# Patient Record
Sex: MALE | Race: ASIAN
[De-identification: names, ages, dates, MRNs, and addresses within clinical notes are randomized per-mention and may not be internally consistent; named-entity substitution may affect disease eponyms.]

---

## 2022-08-06 ENCOUNTER — HOSPITAL ENCOUNTER (INPATIENT)
Dept: HOSPITAL 54 - ER | Age: 87
LOS: 3 days | Discharge: HOME | DRG: 309 | End: 2022-08-09
Attending: INTERNAL MEDICINE | Admitting: NURSE PRACTITIONER
Payer: COMMERCIAL

## 2022-08-06 VITALS — SYSTOLIC BLOOD PRESSURE: 98 MMHG | DIASTOLIC BLOOD PRESSURE: 48 MMHG

## 2022-08-06 VITALS — WEIGHT: 142 LBS | HEIGHT: 67 IN | BODY MASS INDEX: 22.29 KG/M2

## 2022-08-06 DIAGNOSIS — E11.9: ICD-10-CM

## 2022-08-06 DIAGNOSIS — Z87.891: ICD-10-CM

## 2022-08-06 DIAGNOSIS — E87.5: ICD-10-CM

## 2022-08-06 DIAGNOSIS — R13.10: ICD-10-CM

## 2022-08-06 DIAGNOSIS — I10: ICD-10-CM

## 2022-08-06 DIAGNOSIS — D64.9: ICD-10-CM

## 2022-08-06 DIAGNOSIS — I47.1: Primary | ICD-10-CM

## 2022-08-06 DIAGNOSIS — E88.09: ICD-10-CM

## 2022-08-06 DIAGNOSIS — E22.2: ICD-10-CM

## 2022-08-06 DIAGNOSIS — R62.7: ICD-10-CM

## 2022-08-06 DIAGNOSIS — E87.2: ICD-10-CM

## 2022-08-06 DIAGNOSIS — D72.829: ICD-10-CM

## 2022-08-06 DIAGNOSIS — C34.90: ICD-10-CM

## 2022-08-06 DIAGNOSIS — E86.0: ICD-10-CM

## 2022-08-06 DIAGNOSIS — E78.5: ICD-10-CM

## 2022-08-06 LAB
BASOPHILS # BLD AUTO: 0.1 K/UL (ref 0–0.2)
BASOPHILS NFR BLD AUTO: 0.2 % (ref 0–2)
BILIRUB DIRECT SERPL-MCNC: 0.2 MG/DL (ref 0–0.2)
BILIRUB SERPL-MCNC: 0.5 MG/DL (ref 0.2–1)
BILIRUB UR QL STRIP: NEGATIVE
BUN SERPL-MCNC: 14 MG/DL (ref 7–18)
CALCIUM SERPL-MCNC: 8.9 MG/DL (ref 8.5–10.1)
CHLORIDE SERPL-SCNC: 101 MMOL/L (ref 98–107)
CO2 SERPL-SCNC: 27 MMOL/L (ref 21–32)
COLOR UR: YELLOW
CREAT SERPL-MCNC: 0.8 MG/DL (ref 0.6–1.3)
DEPRECATED SQUAMOUS URNS QL MICRO: (no result) /HPF
EOSINOPHIL NFR BLD AUTO: 1.2 % (ref 0–6)
GLUCOSE SERPL-MCNC: 131 MG/DL (ref 74–106)
GLUCOSE UR STRIP-MCNC: NEGATIVE MG/DL
HCT VFR BLD AUTO: 41 % (ref 39–51)
HGB BLD-MCNC: 13.4 G/DL (ref 13.5–17.5)
LEUKOCYTE ESTERASE UR QL STRIP: NEGATIVE
LYMPHOCYTES NFR BLD AUTO: 1.3 K/UL (ref 0.8–4.8)
LYMPHOCYTES NFR BLD AUTO: 5.9 % (ref 20–44)
MAGNESIUM SERPL-MCNC: 2.5 MG/DL (ref 1.8–2.4)
MCHC RBC AUTO-ENTMCNC: 32 G/DL (ref 31–36)
MCV RBC AUTO: 92 FL (ref 80–96)
MONOCYTES NFR BLD AUTO: 1.5 K/UL (ref 0.1–1.3)
MONOCYTES NFR BLD AUTO: 6.7 % (ref 2–12)
NEUTROPHILS # BLD AUTO: 19 K/UL (ref 1.8–8.9)
NEUTROPHILS NFR BLD AUTO: 86 % (ref 43–81)
NITRITE UR QL STRIP: NEGATIVE
PH UR STRIP: 7 [PH] (ref 5–8)
PHOSPHATE SERPL-MCNC: 4.6 MG/DL (ref 2.5–4.9)
PLATELET # BLD AUTO: 490 K/UL (ref 150–450)
POTASSIUM SERPL-SCNC: 5.4 MMOL/L (ref 3.5–5.1)
PROT UR QL STRIP: NEGATIVE MG/DL
RBC # BLD AUTO: 4.49 MIL/UL (ref 4.5–6)
RBC #/AREA URNS HPF: (no result) /HPF (ref 0–2)
SODIUM SERPL-SCNC: 134 MMOL/L (ref 136–145)
TSH SERPL DL<=0.005 MIU/L-ACNC: 1.6 UIU/ML (ref 0.36–3.74)
UROBILINOGEN UR STRIP-MCNC: 1 EU/DL
WBC #/AREA URNS HPF: (no result) /HPF (ref 0–3)
WBC NRBC COR # BLD AUTO: 22.1 K/UL (ref 4.3–11)

## 2022-08-06 PROCEDURE — C9803 HOPD COVID-19 SPEC COLLECT: HCPCS

## 2022-08-06 PROCEDURE — G0378 HOSPITAL OBSERVATION PER HR: HCPCS

## 2022-08-06 PROCEDURE — A6403 STERILE GAUZE>16 <= 48 SQ IN: HCPCS

## 2022-08-06 RX ADMIN — SIMVASTATIN SCH MG: 20 TABLET, FILM COATED ORAL at 22:00

## 2022-08-06 NOTE — NUR
TELE RN NOTES





PT ARRIVED TO UNIT VIA GURNEY PT WAS ABLE TO TRANSFER TO BED ON HIS OWN WITH SOME STAND BY 
ASSIST . PT IS Malay SPEAKING BUT IS ABLE TO COMMUNICATE BASIC NEEDS.PT DAUGHTER AND WIFE 
AT BEDSIDE ABLE TO PROVIDE TRANSLATION. PT A/O X4 ON ROOM AIR TOLERATING WELL. PT PLACED ON 
A TELE MONITOR AT THIS TIME. PT NOTED WITH IV ACCESS ON RFA#20G S/L AND LAC #20G  RUNNING D5 
NS@50ML/HR TOLERATING WELL. PT DID REFUSE ATORVASTATIN PER DAUGHTER PT DOES NOT HAVE HIGH 
CHOLESTEROL EXPLAINED THAT IT IS PROTOCOL FOR PT WITH CHEST PAIN PT STILL REFUSED X3. PT 
DAUGHTER ALSO REFUSED TO GIVE A LIST OF MEDICATIONS TAKEN AT HOME PER DAUGHTER" ITS NOT LIKE 
YOU WILL GIVE HIM AT NIGHT ANYWAY, HES GOING HOME IN THE MORNING AS SOON AS POSSIBLE ITS NOT 
NECESSARY.' DID ASK THE DAUGHTER MARCIA TO PLEASE PROVIDE A LIST IN THE MORNING IF IT IS 
POSSIBLE WILL LET DAY SHIFT NURSE KNOW TO FOLLOW UP. PT WAS NOTED WITH SOME SACRAL REDNESS 
PICTURE TAKEN AND PLACED IN CHART.PT ORIENTED TO UNIT AND ROOM. TABLE WITHIN REACH. CALL 
LIGHT WITHIN REACH. BILATERAL SIDE RAILS UP FOR SAFETY. ALL NEEDS MET AT THIS TIME. WILL 
CONTINUE TO MONITOR.

-------------------------------------------------------------------------------

Addendum: 08/06/22 at 2345 by TARIQ HARRIS RN

-------------------------------------------------------------------------------

per daughter her father is refusing any further lab draws explained to the family why ist 
important we redraw the troponin. daughter insisted enough blood has been taken he doesn't 
need anymore.

## 2022-08-06 NOTE — NUR
RECEIVED PT 87 YRS MALE CAME BY WILBERTO  from home   C/O GENRALIZED WEEKNESS 
ANd chest pain with palpation  hr 165/min ekg  at bed velvet

## 2022-08-06 NOTE — NUR
PETE AT BED SIDE (MARCIA PETERSEN ) [phone]  AT BED SIDE DR. HOLLY VILCHIS WITH  [facility] CONDITION UP DATE

## 2022-08-06 NOTE — NUR
TELE RN NOTES





PT REFUSED LAB DRAW RISK AND BENEFITS EXPLAINED X3 REFUSED X3. WILL CONTINUE TO MONITOR.

## 2022-08-07 VITALS — SYSTOLIC BLOOD PRESSURE: 119 MMHG | DIASTOLIC BLOOD PRESSURE: 69 MMHG

## 2022-08-07 VITALS — DIASTOLIC BLOOD PRESSURE: 54 MMHG | SYSTOLIC BLOOD PRESSURE: 105 MMHG

## 2022-08-07 VITALS — DIASTOLIC BLOOD PRESSURE: 59 MMHG | SYSTOLIC BLOOD PRESSURE: 109 MMHG

## 2022-08-07 VITALS — DIASTOLIC BLOOD PRESSURE: 47 MMHG | SYSTOLIC BLOOD PRESSURE: 100 MMHG

## 2022-08-07 VITALS — SYSTOLIC BLOOD PRESSURE: 117 MMHG | DIASTOLIC BLOOD PRESSURE: 69 MMHG

## 2022-08-07 VITALS — DIASTOLIC BLOOD PRESSURE: 69 MMHG | SYSTOLIC BLOOD PRESSURE: 113 MMHG

## 2022-08-07 LAB
ALBUMIN SERPL BCP-MCNC: 2 G/DL (ref 3.4–5)
ALP SERPL-CCNC: 77 U/L (ref 46–116)
ALT SERPL W P-5'-P-CCNC: 10 U/L (ref 12–78)
AST SERPL W P-5'-P-CCNC: 12 U/L (ref 15–37)
BASOPHILS # BLD AUTO: 0.1 K/UL (ref 0–0.2)
BASOPHILS NFR BLD AUTO: 0.3 % (ref 0–2)
BILIRUB SERPL-MCNC: 0.7 MG/DL (ref 0.2–1)
BUN SERPL-MCNC: 9 MG/DL (ref 7–18)
CALCIUM SERPL-MCNC: 8.2 MG/DL (ref 8.5–10.1)
CHLORIDE SERPL-SCNC: 102 MMOL/L (ref 98–107)
CO2 SERPL-SCNC: 25 MMOL/L (ref 21–32)
CREAT SERPL-MCNC: 0.6 MG/DL (ref 0.6–1.3)
EOSINOPHIL NFR BLD AUTO: 1.5 % (ref 0–6)
FERRITIN SERPL-MCNC: 611 NG/ML (ref 8–388)
GLUCOSE SERPL-MCNC: 100 MG/DL (ref 74–106)
HCT VFR BLD AUTO: 34 % (ref 39–51)
HGB BLD-MCNC: 11.4 G/DL (ref 13.5–17.5)
IRON SERPL-MCNC: 26 UG/DL (ref 50–175)
LYMPHOCYTES NFR BLD AUTO: 1 K/UL (ref 0.8–4.8)
LYMPHOCYTES NFR BLD AUTO: 4.9 % (ref 20–44)
MAGNESIUM SERPL-MCNC: 2.1 MG/DL (ref 1.8–2.4)
MCHC RBC AUTO-ENTMCNC: 33 G/DL (ref 31–36)
MCV RBC AUTO: 90 FL (ref 80–96)
MONOCYTES NFR BLD AUTO: 1.3 K/UL (ref 0.1–1.3)
MONOCYTES NFR BLD AUTO: 6.3 % (ref 2–12)
NEUTROPHILS # BLD AUTO: 18.6 K/UL (ref 1.8–8.9)
NEUTROPHILS NFR BLD AUTO: 87 % (ref 43–81)
PHOSPHATE SERPL-MCNC: 3.1 MG/DL (ref 2.5–4.9)
PLATELET # BLD AUTO: 442 K/UL (ref 150–450)
POTASSIUM SERPL-SCNC: 4.1 MMOL/L (ref 3.5–5.1)
PROT SERPL-MCNC: 6 G/DL (ref 6.4–8.2)
RBC # BLD AUTO: 3.8 MIL/UL (ref 4.5–6)
SODIUM SERPL-SCNC: 132 MMOL/L (ref 136–145)
TIBC SERPL-MCNC: 113 UG/DL (ref 250–450)
WBC NRBC COR # BLD AUTO: 21.3 K/UL (ref 4.3–11)

## 2022-08-07 RX ADMIN — Medication SCH EACH: at 22:11

## 2022-08-07 RX ADMIN — DEXTROSE MONOHYDRATE SCH MLS/HR: 50 INJECTION, SOLUTION INTRAVENOUS at 20:08

## 2022-08-07 RX ADMIN — SIMVASTATIN SCH MG: 20 TABLET, FILM COATED ORAL at 22:05

## 2022-08-07 RX ADMIN — HUMAN INSULIN PRN UNIT: 100 INJECTION, SOLUTION SUBCUTANEOUS at 12:36

## 2022-08-07 RX ADMIN — INSULIN HUMAN PRN UNITS: 100 INJECTION, SOLUTION PARENTERAL at 22:14

## 2022-08-07 RX ADMIN — Medication SCH EACH: at 17:12

## 2022-08-07 RX ADMIN — ENOXAPARIN SODIUM SCH MG: 40 INJECTION SUBCUTANEOUS at 08:21

## 2022-08-07 RX ADMIN — Medication SCH EACH: at 11:59

## 2022-08-07 RX ADMIN — INSULIN HUMAN PRN UNITS: 100 INJECTION, SOLUTION PARENTERAL at 17:17

## 2022-08-07 RX ADMIN — ASPIRIN 81 MG SCH MG: 81 TABLET ORAL at 08:19

## 2022-08-07 NOTE — NUR
RN CLOSING NOTES:

PATIENT ASLEEP IN BED BUT EASILY AROUSES WHEN CALLED BY NURSE.  PATIENT IS ALERT, ORIENTED X 
4.  ON SR WITH HR OF 86.  NO SOB, BREATHING EQUAL AND UNLABORED.  PATIENT HAS IV SITE ON 
LEFT AC # 20 INFUSING WITH D5 NS @ 50 ML/HR, IV SITE, NOTED WITH NO INFILTRATION.  BED 
LOCKED AND IN LOWEST POSITION, CALL LIGHT WITHIN REACH.  WILL ENDORSE TO NEXT SHIFT NURSE 
FOR CONTINUITY OF CARE

## 2022-08-07 NOTE — NUR
TELE RN NOTES





PT IN BED ASLEEP BUT EASILY AWAKE,NO DISTRESS  NOTED AT THIS TIME ,ON ROOM AIR.NO SOB NOTED 
AT THIS TIME ,ALL  NEEDS MET. BEDSIDE TABLE WITHIN EACH. CALL LIGHT WITHIN REACH. BED ALARM 
ON FOR SAFETY. BILATERAL SIDE RAILS UP FOR SAFETY.. PT HAS D5 NS @50ML/HR RUINING WELL ON 
THE LAC #20G. ON TELE MONITORS SR WITH PVC OCCASIONAL, HR 83 AT THIS TIME, WILL CONT TO 
MONITOR

## 2022-08-07 NOTE — NUR
tele rn note 

 Rolanda barba npo seen patient patient condition updated ,refused to eat lunch at this time 
stated that daughter is coming soon ,will f\u

## 2022-08-07 NOTE — NUR
LVN/MED RECON



UNABLE TO UPDATE HOME MEDICATION INFORMATION AT THIS TIME. PATIENT UNABLE TO PROVIDE ANY 
INFORMATION. AS PER PATIENT REQUESTED CALLED DAUGHTER-YAZ, PER DAUGHTER WILL PROVIDE 
INFORMATION LATER. CN MADE AWARE.

## 2022-08-07 NOTE — NUR
PER ONCOLOGIST MAGO, FAXED AUTHORIZATION TO RELEASE RECORDS FROM [facility], TELEPHONE 
CONSENT DONE PER DAUGHTER

## 2022-08-07 NOTE — NUR
TELE RN NOTES





PT IN BED ASLEEP IN NO DISTRESS CONTINUOUS TO BE ON ROOM AIR. ALL NURSING NEEDS MET. BEDSIDE 
TABLE WITHIN EACH. CALL LIGHT WITHIN REACH. BED ALARM ON FOR SAFETY. BILATERAL SIDE RAILS UP 
FOR SAFETY. PT ON TELE MONITOR READING SR. PT HAS D5 NS @50ML/HR RUINING WELL ON THE LAC 
#20G.  PT DID REFUSE LABS LAST NIGH. WILL ENDORSE TO DAY SHIFT NURSE TO SEE IF PT WILL AGREE 
TO HAVE LABS DRAWN TODAY. ENDORSE DHEERAJ TO DAY SHIFT NURSE.

## 2022-08-07 NOTE — NUR
TELE RN OPENING NOTE

PT RECEIVED IN BED, AWAKE, A&O X4, CALM, COOPERATIVE, MAINLY Romanian-SPEAKING BUT IS ABLE TO 
MAKE NEEDS KNOWN WITH LITTLE ENGLISH. PT ON RA WITH CURRENT O2SAT OF 95%; NO S/S OF RESP 
DISTRESS, NO SOB OR COUGH, NON-LABORED AND EQUAL BREATHING, APPEARS COMFORTABLE. PT ATTACHED 
TO EXTERNAL MONITOR SR WITH HR OF 90; NO COMPLAINT OF CHEST PAIN. PT AMBULATORY WITH STEADY 
GAIT USING WALKER. IV ACCESS ON RFA 20G AND LAC 20G WITH D5NS RUNNING AT 50 ML/HR; IV'S 
INTACT AND PATENT, FLUSHES EASILY WITH NO RESISTANCE. BED IN LOWEST POSITION, CALL LIGHT 
WITHIN REACH, SIDE RAILS UP X2. WILL CONTINUE TO MONITOR THROUGHOUT THE NIGHT.

## 2022-08-08 VITALS — DIASTOLIC BLOOD PRESSURE: 64 MMHG | SYSTOLIC BLOOD PRESSURE: 113 MMHG

## 2022-08-08 VITALS — DIASTOLIC BLOOD PRESSURE: 69 MMHG | SYSTOLIC BLOOD PRESSURE: 117 MMHG

## 2022-08-08 VITALS — DIASTOLIC BLOOD PRESSURE: 54 MMHG | SYSTOLIC BLOOD PRESSURE: 99 MMHG

## 2022-08-08 VITALS — SYSTOLIC BLOOD PRESSURE: 102 MMHG | DIASTOLIC BLOOD PRESSURE: 61 MMHG

## 2022-08-08 VITALS — SYSTOLIC BLOOD PRESSURE: 108 MMHG | DIASTOLIC BLOOD PRESSURE: 71 MMHG

## 2022-08-08 VITALS — SYSTOLIC BLOOD PRESSURE: 105 MMHG | DIASTOLIC BLOOD PRESSURE: 66 MMHG

## 2022-08-08 LAB
IGA SERPL-MCNC: 384 MG/DL (ref 61–437)
IGM SERPL-MCNC: 55 MG/DL (ref 15–143)

## 2022-08-08 RX ADMIN — AMIODARONE HYDROCHLORIDE SCH MG: 200 TABLET ORAL at 21:03

## 2022-08-08 RX ADMIN — DEXTROSE MONOHYDRATE SCH MLS/HR: 50 INJECTION, SOLUTION INTRAVENOUS at 20:06

## 2022-08-08 RX ADMIN — INSULIN HUMAN PRN UNITS: 100 INJECTION, SOLUTION PARENTERAL at 08:19

## 2022-08-08 RX ADMIN — Medication SCH EACH: at 12:59

## 2022-08-08 RX ADMIN — LINAGLIPTIN SCH MG: 5 TABLET, FILM COATED ORAL at 10:40

## 2022-08-08 RX ADMIN — Medication SCH EACH: at 17:12

## 2022-08-08 RX ADMIN — HUMAN INSULIN PRN UNIT: 100 INJECTION, SOLUTION SUBCUTANEOUS at 21:31

## 2022-08-08 RX ADMIN — AMIODARONE HYDROCHLORIDE SCH MG: 200 TABLET ORAL at 10:39

## 2022-08-08 RX ADMIN — DRONABINOL SCH MG: 2.5 CAPSULE ORAL at 10:40

## 2022-08-08 RX ADMIN — MEGESTROL ACETATE SCH MG: 40 TABLET ORAL at 17:03

## 2022-08-08 RX ADMIN — DRONABINOL SCH MG: 2.5 CAPSULE ORAL at 17:03

## 2022-08-08 RX ADMIN — NATEGLINIDE SCH MG: 60 TABLET ORAL at 18:26

## 2022-08-08 RX ADMIN — TAMSULOSIN HYDROCHLORIDE SCH MG: 0.4 CAPSULE ORAL at 10:38

## 2022-08-08 RX ADMIN — Medication SCH EACH: at 08:09

## 2022-08-08 RX ADMIN — INSULIN HUMAN PRN UNITS: 100 INJECTION, SOLUTION PARENTERAL at 17:15

## 2022-08-08 RX ADMIN — DRONABINOL SCH MG: 2.5 CAPSULE ORAL at 13:13

## 2022-08-08 RX ADMIN — INSULIN HUMAN PRN UNITS: 100 INJECTION, SOLUTION PARENTERAL at 13:01

## 2022-08-08 RX ADMIN — METOPROLOL TARTRATE SCH MG: 25 TABLET, FILM COATED ORAL at 21:00

## 2022-08-08 RX ADMIN — ENOXAPARIN SODIUM SCH MG: 40 INJECTION SUBCUTANEOUS at 08:46

## 2022-08-08 RX ADMIN — METOPROLOL TARTRATE SCH MG: 25 TABLET, FILM COATED ORAL at 08:43

## 2022-08-08 RX ADMIN — ASPIRIN 81 MG SCH MG: 81 TABLET ORAL at 08:45

## 2022-08-08 RX ADMIN — Medication SCH EACH: at 21:26

## 2022-08-08 NOTE — NUR
RN NOTE

ADENOSINE SCHEDULED TO BE GIVEN ONCE AT 1011 WAS NOT ADMINISTERED AS PT WAS ABLE TO CONVERT 
FROM SVT TO ST ON HIS OWN SPONTANEOUSLY.

## 2022-08-08 NOTE — NUR
PRIOR TO ADMINISTERING ADENOSINE, PATIENT CONVERTED TO SINUS TACH WITH HR .  DR JIMENEZ 
MADE AWARE WITH NEW ORDERS TO START AMIODARONE 400 MG PO BID.  NOTED AND CARRIED OUT

## 2022-08-08 NOTE — NUR
RN CLOSING NOTES:



PATIENT IN BED ASLEEP BUT EASILY AROUSES TO VOICE.  BREATHING EVEN AND UNLABORED.  NO SOB 
NOTED.  ON SR PER TELE MONITOR WITH HR OF 91.  RFA IV ACCESS RUNNING WITH NS @ 50 ML/HR. 
SALINE LOCK INTACT ON LEFT AC.  PATIENT HAS NO C.O PAIN OR DISCOMFORT AT THIS TIME. PATIENT 
VOIDED 1000 ML ON URINAL.  BED KEPT IN LOWEST POSITION.  CALL LIGHT WITHIN REACH,  WILL 
ENDORSE TO NEXT SHIFT NURSE FOR CONTINUATION OF CARE.

## 2022-08-08 NOTE — NUR
PATIENT NOTED TO HAVE SVT WITH , PATIENT, AWAKE, ALERT, VERBALLY RESPONSIVE, INFORMED 
AKIRA ROSALES NP AND DR. JIMENEZ CARDIOLOGIST.  AWAITING FOR ORDERS.

## 2022-08-08 NOTE — NUR
TELE RN OPENING NOTE

PT RECEIVED IN BED, AWAKE, A&O X4, CALM, COOPERATIVE. ON RA WITH CURRENT O2SAT OF 94%; NO 
S/S OF RESP DISTRESS, NO SOB, NON-LABORED AND EQUAL BREATHING; NOTED TO HAVE NON-PRODUCTIVE 
COUGH. PT ATTACHED TO EXTERNAL MONITOR, CURRENTLY SR WITH HR OF 97; WILL CLOSELY MONITOR FOR 
ANY SIGNS OF SVT. PT NOTED TO BE AMBULATORY WITH USE OF WALKER. IV ACCESS ON RFA 20G AND LAC 
20G; CURRENTLY HAS NO FLUIDS/MEDS RUNNING THROUGH IT. BED IN LOWEST POSITION, CALL LIGHT 
WITHIN REACH, SIDE RAILS UP X2. WILL CONTINUE TO MONITOR THROUGHOUT THE NIGHT.

## 2022-08-08 NOTE — NUR
TELE RN CLOSING NOTE

PT SITTING ON CHAIR, AWAKE, A&O X4, CALM, COOPERATIVE; SLEPT WELL THROUGHOUT THE NIGHT. 
REMAINS ON RA, O2SAT RANGED FROM 93%-95% DURING THE NIGHT, NO S/S OF RESP DISTRESS, NO SOB, 
NON-LABORED AND EQUAL BREATHING; NOTED TO HAVE NON-PRODUCTIVE COUGH. ATTACHED TO EXTERNAL 
MONITOR SR WITH HR RANGING FROM 90-97 LAST NIGHT. PT AMBULATORY WITH WALKER. IV ACCESS ON 
RFA 20G AND LAC 20G INTACT AND PATENT, FLUSHES EASILY WITH NO RESISTANCE. PT REPORTS HE 
WANTS TO BE DISCONNECTED FROM THE IV RIGHT NOW. ALL DUE MEDS ADMINISTERED DURING THE NIGHT. 
WILL ENDORSE TO DAYSHIFT NURSE TO CONTINUE CARE.

## 2022-08-08 NOTE — NUR
RN OPENING NOTES:



RECEIVED PATIENT IN BED, AWAKE, ALERT AND ORIENTED X 4.  BREATHING EVEN AND UNLABORED.  NO 
SOB NOTED.  ON SR WITH HR OF 90.  HAS IV ACCESS ON RFA INFUSING WITH NS @ 50 ML/HR, NO S/S 
INFILTRATION NOTED.  SALINE LOCK ON LAC # 2, PATENT AND FLUSHING WELL. HOB ELEVATED.  BED 
LOCKED AND IN LOWEST POSITION.  SAFETY MEASURES IN PLACE.  CALL LIGHT WITHIN REACH.  WILL 
CONTINUE TO MONITOR PATIENT THROUGHOUT SHIFT.

## 2022-08-09 VITALS — DIASTOLIC BLOOD PRESSURE: 50 MMHG | SYSTOLIC BLOOD PRESSURE: 106 MMHG

## 2022-08-09 VITALS — DIASTOLIC BLOOD PRESSURE: 60 MMHG | SYSTOLIC BLOOD PRESSURE: 95 MMHG

## 2022-08-09 VITALS — SYSTOLIC BLOOD PRESSURE: 107 MMHG | DIASTOLIC BLOOD PRESSURE: 57 MMHG

## 2022-08-09 VITALS — SYSTOLIC BLOOD PRESSURE: 102 MMHG | DIASTOLIC BLOOD PRESSURE: 55 MMHG

## 2022-08-09 LAB
ALBUMIN/GLOB SERPL: 0.7 {RATIO} (ref 0.7–1.7)
ALPHA1 GLOB SERPL ELPH-MCNC: 0.3 G/DL (ref 0–0.4)
ALPHA2 GLOB SERPL ELPH-MCNC: 0.7 G/DL (ref 0.4–1)
B-GLOBULIN SERPL ELPH-MCNC: 1 G/DL (ref 0.7–1.3)
BASOPHILS # BLD AUTO: 0 K/UL (ref 0–0.2)
BASOPHILS NFR BLD AUTO: 0.2 % (ref 0–2)
BUN SERPL-MCNC: 9 MG/DL (ref 7–18)
CALCIUM SERPL-MCNC: 8.7 MG/DL (ref 8.5–10.1)
CHLORIDE SERPL-SCNC: 99 MMOL/L (ref 98–107)
CO2 SERPL-SCNC: 25 MMOL/L (ref 21–32)
CREAT SERPL-MCNC: 0.7 MG/DL (ref 0.6–1.3)
EOSINOPHIL NFR BLD AUTO: 2 % (ref 0–6)
GLUCOSE SERPL-MCNC: 201 MG/DL (ref 74–106)
HCT VFR BLD AUTO: 35 % (ref 39–51)
HGB BLD-MCNC: 11.7 G/DL (ref 13.5–17.5)
LYMPHOCYTES NFR BLD AUTO: 1.3 K/UL (ref 0.8–4.8)
LYMPHOCYTES NFR BLD AUTO: 6 % (ref 20–44)
M PROTEIN SERPL ELPH-MCNC: 0.2 G/DL
MAGNESIUM SERPL-MCNC: 2 MG/DL (ref 1.8–2.4)
MCHC RBC AUTO-ENTMCNC: 33 G/DL (ref 31–36)
MCV RBC AUTO: 90 FL (ref 80–96)
MONOCYTES NFR BLD AUTO: 1.5 K/UL (ref 0.1–1.3)
MONOCYTES NFR BLD AUTO: 7 % (ref 2–12)
NEUTROPHILS # BLD AUTO: 18.3 K/UL (ref 1.8–8.9)
NEUTROPHILS NFR BLD AUTO: 84.8 % (ref 43–81)
PHOSPHATE SERPL-MCNC: 3.5 MG/DL (ref 2.5–4.9)
PLATELET # BLD AUTO: 420 K/UL (ref 150–450)
POTASSIUM SERPL-SCNC: 4.3 MMOL/L (ref 3.5–5.1)
RBC # BLD AUTO: 3.89 MIL/UL (ref 4.5–6)
SODIUM SERPL-SCNC: 132 MMOL/L (ref 136–145)
WBC NRBC COR # BLD AUTO: 21.6 K/UL (ref 4.3–11)

## 2022-08-09 RX ADMIN — METOPROLOL TARTRATE SCH MG: 25 TABLET, FILM COATED ORAL at 09:29

## 2022-08-09 RX ADMIN — TAMSULOSIN HYDROCHLORIDE SCH MG: 0.4 CAPSULE ORAL at 09:29

## 2022-08-09 RX ADMIN — Medication SCH EACH: at 07:49

## 2022-08-09 RX ADMIN — INSULIN HUMAN PRN UNITS: 100 INJECTION, SOLUTION PARENTERAL at 07:51

## 2022-08-09 RX ADMIN — ENOXAPARIN SODIUM SCH MG: 40 INJECTION SUBCUTANEOUS at 09:30

## 2022-08-09 RX ADMIN — NATEGLINIDE SCH MG: 60 TABLET ORAL at 12:07

## 2022-08-09 RX ADMIN — Medication SCH EACH: at 12:11

## 2022-08-09 RX ADMIN — DRONABINOL SCH MG: 2.5 CAPSULE ORAL at 09:38

## 2022-08-09 RX ADMIN — DRONABINOL SCH MG: 2.5 CAPSULE ORAL at 12:08

## 2022-08-09 RX ADMIN — AMIODARONE HYDROCHLORIDE SCH MG: 200 TABLET ORAL at 09:28

## 2022-08-09 RX ADMIN — NATEGLINIDE SCH MG: 60 TABLET ORAL at 07:49

## 2022-08-09 RX ADMIN — INSULIN HUMAN PRN UNITS: 100 INJECTION, SOLUTION PARENTERAL at 12:10

## 2022-08-09 RX ADMIN — ASPIRIN 81 MG SCH MG: 81 TABLET ORAL at 09:27

## 2022-08-09 RX ADMIN — MEGESTROL ACETATE SCH MG: 40 TABLET ORAL at 09:34

## 2022-08-09 RX ADMIN — LINAGLIPTIN SCH MG: 5 TABLET, FILM COATED ORAL at 09:29

## 2022-08-09 NOTE — NUR
RN NOTE



PATIENT DISCHARGED IN STABLE CONDITION. PATIENT AWAKE, ALERT/ORIENTED X4, ABLE TO MAKE NEEDS 
KNOWN. PATIENT ON ROOM AIR TOLERATING WELL. BREATHING UNLABORED. NO SOB OR ANY RESPIRATORY 
DISTRESS NOTED AT THE TIME. IV ACCESS REMOVED, NO BLEEDING NOTED. ALL BELONGINGS ACCOUNTED 
FOR AND GIVEN TO PATIENT. DISCHARGED INSTRUCTIONS GIVEN TO PATIENT. ALL DUE MEDS GIVEN AS 
ORDERED. ALL NEEDS ANTICIPATED. KEPT PATIENT CLEAN DRY AND COMFORTABLE. PATIENT WHEELED OUT 
OF HOSPITAL TO PRIVATE CAR. PATIENT LEFT IN STABLE CONDITION WITH DAUGHTER MARCIA.

## 2022-08-09 NOTE — NUR
TELE RN CLOSING NOTE

PT REMAINS IN BED, A&O X4, ASLEEP BUT EASILY AROUSABLE, SLEPT WELL THROUGHOUT THE NIGHT, 
CALM, COOPERATIVE. REMAINS ON RA WITH O2SAT 94% THROUGHOUT THE NIGHT. ATTACHED TO EXTERNAL 
MONITOR, SR THROUGHOUT THE WHOLE NIGHT WITH NO EPISODE OF SVT, HR RANGED FROM 77-97 DURING 
THE NIGHT. RFA 20G AND LAC 20G INTACT AND PATENT, FLUSHES EASILY WITH NO RESISTANCE; 
CURRENTLY HAS NO MEDS RUNNING THROUGH IT. PT HAD NO BM; NO STOOL WAS COLLECTED FOR OCCULT 
BLOOD. ALL DUE MEDS ADMINISTERED DURING THE NIGHT. BED IN LOWEST POSITION, CALL LIGHT WITHIN 
REACH, SIDE RAILS UP X2.

## 2022-08-09 NOTE — NUR
RN OPENING NOTES



RECEIVED PATIENT IN BED, AWAKE, ALERT AND ORIENTED X 4.  BREATHING EVEN AND UNLABORED.  NO 
SOB OR ANY RESPIRATORY DISTRESS NOTED AT THE TIME. ON TELE MONITOR READING SR WITH HR OF 85. 
IV ACCESS NOTED ON RFA #20G AND LAC #20G, INTACT AND PATENT AND FLUSHING WELL. ALL SAFETY 
MEASURES IN PLACE. HOB ELEVATED.  BED LOCKED AND IN LOWEST POSITION WITH SIDERAILS UP WITH 
CALL LIGHT WITHIN REACH.  WILL CONTINUE TO MONITOR PATIENT THROUGHOUT SHIFT.